# Patient Record
Sex: FEMALE | Race: WHITE
[De-identification: names, ages, dates, MRNs, and addresses within clinical notes are randomized per-mention and may not be internally consistent; named-entity substitution may affect disease eponyms.]

---

## 2017-04-21 ENCOUNTER — HOSPITAL ENCOUNTER (EMERGENCY)
Dept: HOSPITAL 80 - CED | Age: 22
Discharge: HOME | End: 2017-04-21
Payer: COMMERCIAL

## 2017-04-21 VITALS
HEART RATE: 104 BPM | OXYGEN SATURATION: 98 % | DIASTOLIC BLOOD PRESSURE: 80 MMHG | TEMPERATURE: 98.2 F | RESPIRATION RATE: 18 BRPM | SYSTOLIC BLOOD PRESSURE: 124 MMHG

## 2017-04-21 DIAGNOSIS — J20.9: Primary | ICD-10-CM

## 2017-04-21 NOTE — UCPHY
H & P


Time Seen by Provider: 04/21/17 17:45


Patient Type: Established


HPI/ROS: 





This pt. c/o a 3 week hx. of dry cough that is worsening.  She saw Dr. Demi Orellana at [facility] 8 days ago was diagnosed with bronchitis, started 

on albuterol inhaler without spacer and a course of prednisone-5 day burst.  

She reports compliance with the prednisone and she tried the albuterol for few 

days but did feel she was improving so she stopped using it.  She feels over 

the past few days she has had increasing frequency intensity of the cough.  She 

notes no other exacerbating or alleviating factors.





ROS:  No fevers or chills. No other constitutional symptoms except for mild 

myalgias.  HEENT:  She has some coryza and a frontal headache of mild intensity 

similar to previous headaches achy in nature.  No sore throat no ear pain. 

Pulmonary:  No pleuritic pain. No hemoptysis.  No respiratory distress. She is 

able to sleep at night despite the cough.  Cardiovascular:  No lightheadedness.

  No calf swelling or pain.  GI:  No nausea vomiting.  Integumentary:  No skin 

rash. 10 point ROS is otherwise negative.





Past Medical/Surgical History: 





She had a negative chest x-ray performed comparable medicine last week.  She 

denies any history of asthma though she reports that the [facility] physician 

felt she might have reactive airway disease.


Smoking Status: Never smoked


Physical Exam: 





Physical Exam


Vital signs are normal.


General:  No acute distress


HEENT:  Nose:  Clear discharge bilaterally. No sinus tenderness to percussion.  

Ears:  External canals and tympanic membranes are clear with no erythema or 

abnormal findings bilaterally. Oropharynx:  No erythema or exudates. No 

dysphonia.  No drooling or stridor.  


Eyes:  Pupils equal and react to light.  Extraocular motions are intact.


Neck:  Supple with no meningismus.


Lungs:  Faint expiratory wheeze only when she coughs.  No rales or rhonchi.  No 

increased work of breathing.


Cardiac:  Regular rate and rhythm with no murmur gallop or rub.  No calf 

swelling or tenderness.


Skin:  No rash or pallor.


Neuro:  Alert with no focal deficits noted.





Initial differential diagnosis:  Bacterial bronchitis, viral bronchitis with 

reactive airway disease, doubt pneumonia, URI with cough





Constitutional: 





 Initial Vital Signs











Temperature (C)  36.8 C   04/21/17 17:25


 


Heart Rate  104 H  04/21/17 17:25


 


Respiratory Rate  18   04/21/17 17:25


 


Blood Pressure  124/80 H  04/21/17 17:25


 


O2 Sat (%)  98   04/21/17 17:25








 











O2 Delivery Mode               Room Air














Allergies/Adverse Reactions: 


 





No Known Allergies Allergy (Verified 12/27/16 08:32)


 








Home Medications: 














 Medication  Instructions  Recorded


 


Bcp  09/22/15


 


Antidepressants  04/21/17


 


Azithromycin [Zithromax] 250 mg PO DAILY #6 tab 04/21/17














MDM/Departure





- [facility]


ED Course/Re-evaluation: 





This patient has no clinical evidence of sepsis or other concerning findings.  

She has a lingering cough for 3 weeks now.  Will cover her with macrolide 

antibiotic for potential atypical bacteria.  Also encouraged her to use her 

albuterol and we gave her a spacer to use with the albuterol fall for better 

medication delivery.





- Depart


Disposition: Home, Routine, Self-Care


Clinical Impression: 


Acute bronchitis


Qualifiers:


 Bronchitis organism: unspecified organism Qualified Code(s): J20.9 - Acute 

bronchitis, unspecified





Condition: Good


Instructions:  Acute Bronchitis (ED)


Additional Instructions: 


Diagnosis:  Acute bronchitis





Plan:  Humidifier





Guaifenesin over-the-counter





Albuterol inhaler with spacer-2 puffs every 4 hours as needed for cough, wheeze 

or shortness of breath





At Zithromax antibiotic





Return for any significant worsening despite the treatment plan.


Prescriptions: 


Azithromycin [Zithromax] 250 mg PO DAILY #6 tab


Referrals: 


NONE *PRIMARY CARE P,. [Primary Care Provider] - As per Instructions





- PQRS


PQRS Measurement: 





NA

## 2017-07-13 ENCOUNTER — HOSPITAL ENCOUNTER (EMERGENCY)
Dept: HOSPITAL 80 - FED | Age: 22
LOS: 1 days | Discharge: HOME | End: 2017-07-14
Payer: COMMERCIAL

## 2017-07-13 VITALS — RESPIRATION RATE: 16 BRPM | OXYGEN SATURATION: 97 %
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
## 2017-07-13 DIAGNOSIS — S93.601A: Primary | ICD-10-CM
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
## 2017-07-13 PROCEDURE — L3260 AMBULATORY SURGICAL BOOT EAC: HCPCS

## 2017-07-13 NOTE — EDPHY
H & P


Stated Complaint: pt says she rolled R ankle while walking on rocks, c/o pain/

swelling


Time Seen by Provider: 07/13/17 23:22


HPI/ROS: 





HPI


The patient presents with right ankle and foot pain after inverting her right 

ankle on a bijal surface at approximately 6:00 p.m. tonight.  She describes 

pain in the dorsum of her foot which is achy, worse when she walks, does not 

radiate, it is not associated with any numbness or tingling.  The pain is 

moderate.  She has no prior ankle or foot injuries..





REVIEW OF SYSTEMS


Constitutional:  No fever, no chills.


Eyes:  No discharge.


ENT:  No sore throat.


Cardiovascular:  No chest pain, no palpitations.


Respiratory:  No cough, no shortness of breath.


Gastrointestinal:  No abdominal pain, no vomiting.


Genitourinary:  No hematuria.


Musculoskeletal:  No back pain.


Skin:  No rashes.


Neurological:  No headache.





PMHx:  Depression








PHYSICAL


General Appearance: Alert, no distress


Eyes: Pupils equal and round no pallor or injection


ENT, Mouth: Mucous membranes moist


Respiratory:  Breathing comfortably


Neurological:  A&O, moves all extremities


Skin:  Warm and dry, no rashes


Musculoskeletal: Neck is supple non tender 


Extremities:  Right foot with tenderness to dorsum with ecchymoses laterally, 2

+ DP pulses, sensation is intact to light touch, full range of motion of ankle


Psychiatric:  Patient is oriented X 3, there is no agitation 





Source: Patient


Exam Limitations: No limitations





- Personal History


Tetanus Vaccine Date: 8/14





- Medical/Surgical History


Hx Asthma: No


Hx Chronic Respiratory Disease: No


Hx Diabetes: No


Hx Cardiac Disease: No


Hx Renal Disease: No


Hx Cirrhosis: No


Hx Alcoholism: No


Hx HIV/AIDS: No


Hx Splenectomy or Spleen Trauma: No


Other PMH: depression





- Social History


Smoking Status: Never smoked


Constitutional: 


 Initial Vital Signs











Temperature (C)  36.6 C   07/13/17 21:58


 


Heart Rate  103 H  07/13/17 21:58


 


Respiratory Rate  16   07/13/17 21:58


 


Blood Pressure  128/87 H  07/13/17 21:58


 


O2 Sat (%)  97   07/13/17 21:58








 











O2 Delivery Mode               Room Air














Allergies/Adverse Reactions: 


 





No Known Allergies Allergy (Verified 07/13/17 22:00)


 








Home Medications: 














 Medication  Instructions  Recorded


 


Bcp  09/22/15


 


Antidepressants  04/21/17














Medical Decision Making





- Diagnostics


Imaging Results: 


 Imaging Impressions





Ankle X-Ray  07/13/17 22:34


Impression: Query ligamentous injury with mortise asymmetry, but no fracture 

appreciated.


 


If there is further clinical concern regarding the patient's symptoms, MR 

imaging could be considered.








Foot x-ray three view shows no fracture, no dislocation, interpreted by me, 

radiology interpretation pending.


Imaging: I viewed and interpreted images myself


Differential Diagnosis: 





This is a 21-year-old female who presents with a fall just prior to arrival in 

which she inverted her ankle and now is complaining of pain of her ankle and 

foot.  Ankle films have been obtained showing no fracture, foot films have been 

ordered.





Differential diagnosis includes ankle sprain, foot fracture, foot sprain.





Foot films were obtained showing no fracture.  Formal reads are pending.  I 

will place her in a cast shoe.  She is able to walk without difficulty.  She 

will be discharged with orthopedic follow-up as needed.





Departure





- Departure


Disposition: Home, Routine, Self-Care


Clinical Impression: 


Sprain of left foot


Qualifiers:


 Encounter type: initial encounter Qualified Code(s): S93.602A - Unspecified 

sprain of left foot, initial encounter





Condition: Good


Instructions:  Foot Sprain (ED), RICE Therapy (ED)


Additional Instructions: 


Please use rest, ice, elevation for your foot.  You can take ibuprofen 400 mg 

and acetaminophen a 1000 mg every 6 hours as needed for pain.  I managing her 

pain will be better in the next few days, however if it is not, you can follow 

up with the orthopedic specialist listed below.








Referrals: 


OCTAVIO WRIGHT NO SPECIFIC [Other] - As per Instructions


Maggie Godoy PA [Physician Assistant] - As per Instructions

## 2017-07-14 VITALS — DIASTOLIC BLOOD PRESSURE: 72 MMHG | TEMPERATURE: 98.1 F | SYSTOLIC BLOOD PRESSURE: 118 MMHG | HEART RATE: 86 BPM

## 2019-06-24 ENCOUNTER — HOSPITAL ENCOUNTER (OUTPATIENT)
Dept: HOSPITAL 80 - F3E | Age: 24
Setting detail: OBSERVATION
LOS: 1 days | Discharge: HOME | End: 2019-06-25
Payer: COMMERCIAL